# Patient Record
Sex: MALE | Race: WHITE | URBAN - METROPOLITAN AREA
[De-identification: names, ages, dates, MRNs, and addresses within clinical notes are randomized per-mention and may not be internally consistent; named-entity substitution may affect disease eponyms.]

---

## 2018-04-17 ENCOUNTER — OFFICE VISIT (OUTPATIENT)
Dept: FAMILY MEDICINE | Facility: CLINIC | Age: 68
End: 2018-04-17
Payer: COMMERCIAL

## 2018-04-17 VITALS
SYSTOLIC BLOOD PRESSURE: 122 MMHG | TEMPERATURE: 97.8 F | BODY MASS INDEX: 32.2 KG/M2 | HEART RATE: 84 BPM | WEIGHT: 226 LBS | RESPIRATION RATE: 20 BRPM | OXYGEN SATURATION: 95 % | DIASTOLIC BLOOD PRESSURE: 70 MMHG

## 2018-04-17 DIAGNOSIS — Z23 NEED FOR PROPHYLACTIC VACCINATION AGAINST STREPTOCOCCUS PNEUMONIAE (PNEUMOCOCCUS): ICD-10-CM

## 2018-04-17 DIAGNOSIS — J41.1 MUCOPURULENT CHRONIC BRONCHITIS (H): ICD-10-CM

## 2018-04-17 DIAGNOSIS — Z23 NEED FOR PROPHYLACTIC VACCINATION WITH TETANUS-DIPHTHERIA (TD): ICD-10-CM

## 2018-04-17 DIAGNOSIS — Z13.6 SCREENING FOR AAA (ABDOMINAL AORTIC ANEURYSM): ICD-10-CM

## 2018-04-17 DIAGNOSIS — H26.493 OTHER SECONDARY CATARACT OF BOTH EYES: Primary | ICD-10-CM

## 2018-04-17 PROBLEM — H26.40 SECONDARY CATARACT OF BOTH EYES: Status: ACTIVE | Noted: 2018-04-17

## 2018-04-17 PROCEDURE — 90670 PCV13 VACCINE IM: CPT | Performed by: FAMILY MEDICINE

## 2018-04-17 PROCEDURE — 90471 IMMUNIZATION ADMIN: CPT | Performed by: FAMILY MEDICINE

## 2018-04-17 PROCEDURE — 99214 OFFICE O/P EST MOD 30 MIN: CPT | Mod: 25 | Performed by: FAMILY MEDICINE

## 2018-04-17 PROCEDURE — G0009 ADMIN PNEUMOCOCCAL VACCINE: HCPCS | Mod: 59 | Performed by: FAMILY MEDICINE

## 2018-04-17 PROCEDURE — 90715 TDAP VACCINE 7 YRS/> IM: CPT | Performed by: FAMILY MEDICINE

## 2018-04-17 RX ORDER — ALBUTEROL SULFATE 90 UG/1
2 AEROSOL, METERED RESPIRATORY (INHALATION) EVERY 6 HOURS PRN
Qty: 3 INHALER | Refills: 1 | Status: SHIPPED | OUTPATIENT
Start: 2018-04-17 | End: 2019-03-01

## 2018-04-17 RX ORDER — TIOTROPIUM BROMIDE 18 UG/1
CAPSULE ORAL; RESPIRATORY (INHALATION)
Qty: 30 CAPSULE | Refills: 1 | Status: SHIPPED | OUTPATIENT
Start: 2018-04-17 | End: 2018-05-18

## 2018-04-17 ASSESSMENT — ENCOUNTER SYMPTOMS
CONSTITUTIONAL NEGATIVE: 1
CARDIOVASCULAR NEGATIVE: 1
SPUTUM PRODUCTION: 1
COUGH: 1
WHEEZING: 1
SHORTNESS OF BREATH: 1

## 2018-04-17 NOTE — NURSING NOTE
Screening Questionnaire for Adult Immunization    Are you sick today?   No   Do you have allergies to medications, food, a vaccine component or latex?   No   Have you ever had a serious reaction after receiving a vaccination?   No   Do you have a long-term health problem with heart disease, lung disease, asthma, kidney disease, metabolic disease (e.g. diabetes), anemia, or other blood disorder?   Yes   Do you have cancer, leukemia, HIV/AIDS, or any other immune system problem?   No   In the past 3 months, have you taken medications that affect  your immune system, such as prednisone, other steroids, or anticancer drugs; drugs for the treatment of rheumatoid arthritis, Crohn s disease, or psoriasis; or have you had radiation treatments?   No   Have you had a seizure, or a brain or other nervous system problem?   No   During the past year, have you received a transfusion of blood or blood     products, or been given immune (gamma) globulin or antiviral drug?   No   For women: Are you pregnant or is there a chance you could become        pregnant during the next month?   No   Have you received any vaccinations in the past 4 weeks?   No     Immunization questionnaire was positive for at least one answer.  Notified Dr. Figueroa.        Per orders of Dr. Figueroa, injection of Tdap, Prevnar 13 given by Xochitl Meeks. Patient instructed to remain in clinic for 15 minutes afterwards, and to report any adverse reaction to me immediately.       Screening performed by Xochitl Meeks on 4/17/2018 at 1:04 PM.

## 2018-04-17 NOTE — MR AVS SNAPSHOT
After Visit Summary   4/17/2018    Vivek Simmons    MRN: [id number]           Patient Information     Date Of Birth          1950        Visit Information        Provider Department      4/17/2018 11:40 AM Ish Figueroa MD [facility]        Today's Diagnoses     Other secondary cataract of both eyes    -  1    Need for prophylactic vaccination against Streptococcus pneumoniae (pneumococcus)        Need for prophylactic vaccination with tetanus-diphtheria (TD)        Screening for AAA (abdominal aortic aneurysm)        Mucopurulent chronic bronchitis (H)           Follow-ups after your visit        Additional Services     OPHTHALMOLOGY ADULT REFERRAL       Your provider has referred you to: [facility]  [phone]  [email or web address]    Please be aware that coverage of these services is subject to the terms and limitations of your health insurance plan.  Call member services at your health plan with any benefit or coverage questions.      Please bring the following with you to your appointment:    (1) Any X-Rays, CTs or MRIs which have been performed.  Contact the facility where they were done to arrange for  prior to your scheduled appointment.    (2) List of current medications  (3) This referral request   (4) Any documents/labs given to you for this referral                  Follow-up notes from your care team     Return in about 1 month (around 5/17/2018).      Future tests that were ordered for you today     Open Future Orders        Priority Expected Expires Ordered    Spirometry, Breathing Capacity, Normal Order, Clinic Performed Routine  7/16/2018 4/17/2018            Who to contact     If you have questions or need follow up information about today's clinic visit or your schedule please contact [facility] directly at [phone].  Normal or non-critical lab and imaging results will  be communicated to you by [employer], letter or phone within 4 business days after the clinic has received the results. If you do not hear from us within 7 days, please contact the clinic through [employer] or phone. If you have a critical or abnormal lab result, we will notify you by phone as soon as possible.  Submit refill requests through [employer] or call your pharmacy and they will forward the refill request to us. Please allow 3 business days for your refill to be completed.          Additional Information About Your Visit        Cook[employer] Information     [employer] gives you secure access to your electronic health record. If you see a primary care provider, you can also send messages to your care team and make appointments. If you have questions, please call your primary care clinic.  If you do not have a primary care provider, please call [phone] and they will assist you.        Care EveryWhere ID     This is your Care EveryWhere ID. This could be used by other organizations to access your [facility] medical records  [id number]        Your Vitals Were     Pulse Temperature Respirations Pulse Oximetry BMI (Body Mass Index)       84 97.8  F (36.6  C) (Oral) 20 95% 32.2 kg/m2        Blood Pressure from Last 3 Encounters:   04/17/18 122/70   09/19/14 114/70   07/13/12 114/68    Weight from Last 3 Encounters:   04/17/18 226 lb (102.5 kg)   09/19/14 222 lb 8 oz (100.9 kg)   07/13/12 227 lb (103 kg)              We Performed the Following          ADMIN VACCINE, ADDL [83166]          ADMIN VACCINE, FIRST     OPHTHALMOLOGY ADULT REFERRAL     Pneumococcal vaccine 13 valent PCV13 IM (Prevnar) [56544]     TDAP VACCINE (ADACEL)          Today's Medication Changes          These changes are accurate as of 4/17/18 12:19 PM.  If you have any questions, ask your nurse or doctor.               Start taking these medicines.        Dose/Directions    OPTICHAMBER ADVANTAGE Misc   Used for:  Mucopurulent chronic bronchitis (H)    Started by:  Ish Figueroa MD        Dose:  1 Device   1 Device as needed   Quantity:  1 each   Refills:  0         These medicines have changed or have updated prescriptions.        Dose/Directions    tiotropium 18 MCG capsule   Commonly known as:  SPIRIVA HANDIHALER   This may have changed:  additional instructions   Changed by:  Ish Figueroa MD        Inhale contents of one capsule daily.   Quantity:  30 capsule   Refills:  1         Stop taking these medicines if you haven't already. Please contact your care team if you have questions.     BREO ELLIPTA IN   Stopped by:  Ish Figueroa MD           levofloxacin 750 MG tablet   Commonly known as:  LEVAQUIN   Stopped by:  Ish Figueroa MD                Where to get your medicines      These medications were sent to [facility]/pharmacy #[id number] - [place], [place] - [address]  [facility]  [address] [address], [facility] [address]     Phone:  [phone]     albuterol 108 (90 Base) MCG/ACT Inhaler    OPTICHAMBER ADVANTAGE Misc    tiotropium 18 MCG capsule                Primary Care Provider Office Phone # Fax #    Robin Ya Wong [phone] [address] [place] [address]        Equal Access to Services     [place]A : Hadii mart ku hadasho Soomaali, waaxda luqadaha, qaybta kaalmada adeegyada, stuart guan. So [facility] [phone].    ATENCIÓN: Si habla español, tiene a martin disposición servicios gratuitos de asistencia lingüística. Kristin al [phone].    We comply with applicable federal civil rights laws and [place] laws. We do not discriminate on the basis of race, color, national origin, age, disability, sex, sexual orientation, or gender identity.            Thank you!     Thank you for choosing [facility]  for your care. Our goal is always to provide you with excellent care. Hearing back from our patients is one way we can continue to improve our  services. Please take a few minutes to complete the written survey that you may receive in the mail after your visit with us. Thank you!             Your Updated Medication List - Protect others around you: Learn how to safely use, store and throw away your medicines at www.disposemymeds.org.          This list is accurate as of 4/17/18 12:19 PM.  Always use your most recent med list.                   Brand Name Dispense Instructions for use Diagnosis    albuterol 108 (90 Base) MCG/ACT Inhaler    PROAIR HFA/PROVENTIL HFA/VENTOLIN HFA    3 Inhaler    Inhale 2 puffs into the lungs every 6 hours as needed for shortness of breath / dyspnea        OPTICHAMBER ADVANTAGE Misc     1 each    1 Device as needed    Mucopurulent chronic bronchitis (H)       tiotropium 18 MCG capsule    SPIRIVA HANDIHALER    30 capsule    Inhale contents of one capsule daily.

## 2018-04-17 NOTE — PROGRESS NOTES
HPI    SUBJECTIVE:   Vivek Simmons is a 67 year old male who presents to clinic today for the following health issues:    COPD Follow-Up    Symptoms are currently: slightly worsened    Current fatigue or dyspnea with ambulation: worsened from baseline    Shortness of breath: slightly worsened    Increased or change in Cough/Sputum: Yes-  Productive cough    Fever(s): No    Baseline ambulation without stopping to rest:  200 feet. Able to walk up 7 flights of stairs without stopping to rest.    Any ER/UC or hospital admissions since your last visit? No     History   Smoking Status     Current Every Day Smoker   Smokeless Tobacco     Not on file     Comment: 1 and a half a day      No results found for: FEV1, WHH9OKH    Amount of exercise or physical activity: None    Problems taking medications regularly: No    Medication side effects: none    Diet: regular (no restrictions)    Increasing MALAGON, more vulnerable to respiratory infections.  Wife is also smoking about a ppd.  Was feeling sort of breath until a few days ago when he started some old doxy.    Retired , lots of eye damage, bad cataracts.    Review of Systems   Constitutional: Negative.    HENT: Negative.    Respiratory: Positive for cough, sputum production, shortness of breath and wheezing.    Cardiovascular: Negative.          Physical Exam   Constitutional: He is oriented to person, place, and time and well-developed, well-nourished, and in no distress.   Eyes: Conjunctivae and EOM are normal.   Cardiovascular: Normal rate, regular rhythm and normal heart sounds.    Pulmonary/Chest: Effort normal. He has wheezes. He has rhonchi.   Musculoskeletal: He exhibits no edema.   Neurological: He is alert and oriented to person, place, and time.   Skin: Skin is warm and dry.   Vitals reviewed.    (H26.493) Other secondary cataract of both eyes  (primary encounter diagnosis)  Comment: referral set up  Plan: OPHTHALMOLOGY ADULT REFERRAL            (Z23) Need  for prophylactic vaccination against Streptococcus pneumoniae (pneumococcus)  Comment: important with COPD  Plan: Pneumococcal vaccine 13 valent PCV13 IM         (Prevnar) [41174],      ADMIN VACCINE, ADDL         [76160]            (Z23) Need for prophylactic vaccination with tetanus-diphtheria (TD)  Comment:   Plan: TDAP VACCINE (ADACEL),      ADMIN VACCINE,         FIRST            (Z13.6) Screening for AAA (abdominal aortic aneurysm)  Comment:   Plan: deferring    (J41.1) Mucopurulent chronic bronchitis (H)  Comment: restarting   Plan: Spacer/Aero-Holding Chambers (OPTICHAMBER         ADVANTAGE) MISC, albuterol, COPD              RTC in 1m    Ish Figueroa MD

## 2018-05-15 NOTE — PROGRESS NOTES
[address], [address]  [facility] [address]  [phone]  Dept: [phone]    PRE-OP EVALUATION:  Today's date: 2018    Vivek Simmons (: 1950) presents for pre-operative evaluation assessment as requested by Dr. Blackwood.  He requires evaluation and anesthesia risk assessment prior to undergoing surgery/procedure for treatment of cataracts.    Proposed Surgery/ Procedure: Cataract  Date of Surgery/ Procedure: 18  Time of Surgery/ Procedure: 1:30 pm  Hospital/Surgical Facility: [facility]  Primary Physician: Robin Wong  Type of Anesthesia Anticipated: to be determined    Patient has a Health Care Directive or Living Will:  NO    1. NO - Do you have a history of heart attack, stroke, stent, bypass or surgery on an artery in the head, neck, heart or legs?  2. NO - Do you ever have any pain or discomfort in your chest?  3. NO - Do you have a history of  Heart Failure?  4. NO - Are you troubled by shortness of breath when: walking on the level, up a slight hill or at night?  5. NO - Do you currently have a cold, bronchitis or other respiratory infection?  6. NO - Do you have a cough, shortness of breath or wheezing?  7. NO - Do you sometimes get pains in the calves of your legs when you walk?  8. NO - Do you or anyone in your family have previous history of blood clots?  9. NO - Do you or does anyone in your family have a serious bleeding problem such as prolonged bleeding following surgeries or cuts?  10. NO - Have you ever had problems with anemia or been told to take iron pills?  11. NO - Have you had any abnormal blood loss such as black, tarry or bloody stools, or abnormal vaginal bleeding?  12. NO - Have you ever had a blood transfusion?  13. NO - Have you or any of your relatives ever had problems with anesthesia?  14. NO - Do you have sleep apnea, excessive snoring or daytime drowsiness?  15. NO - Do you have any  prosthetic heart valves?  16. NO - Do you have prosthetic joints?  17. NO - Is there any chance that you may be pregnant?      HPI:     HPI related to upcoming procedure: Senile cataract, scheduled for R eye first, L eye in two weeks  See problem list for active medical problems.  Problems all longstanding and stable, except as noted/documented.  See ROS for pertinent symptoms related to these conditions.                                                                                                                                                          .  COPD - Patient has a longstanding history of moderate-severe COPD . Patient has been doing well overall noting NO SYMPTOMS and continues on medication regimen consisting of tiotroprium without adverse reactions or side effects.                                                                                                         .        MEDICAL HISTORY:     Patient Active Problem List    Diagnosis Date Noted     Secondary cataract of both eyes 04/17/2018     Priority: Medium     Nicotine dependence 09/19/2014     Priority: Medium     CARDIOVASCULAR SCREENING; LDL GOAL LESS THAN 130 07/24/2012     Priority: Medium     Mucopurulent chronic bronchitis (H) 07/14/2012     Priority: Medium     Advanced directives, counseling/discussion 07/13/2012     Priority: Medium     Discussed advance care planning with patient; however, patient declined at this time. 7/13/2012           History reviewed. No pertinent past medical history.  History reviewed. No pertinent surgical history.  Current Outpatient Prescriptions   Medication Sig Dispense Refill     albuterol (PROAIR HFA/PROVENTIL HFA/VENTOLIN HFA) 108 (90 Base) MCG/ACT Inhaler Inhale 2 puffs into the lungs every 6 hours as needed for shortness of breath / dyspnea 3 Inhaler 1     Spacer/Aero-Holding Chambers (OPTICHAMBER ADVANTAGE) MISC 1 Device as needed 1 each 0     tiotropium (SPIRIVA HANDIHALER) 18 MCG capsule  "Inhale contents of one capsule daily. 30 capsule 1     OTC products: None, except as noted above    No Known Allergies   Latex Allergy: NO    Social History   Substance Use Topics     Smoking status: Current Every Day Smoker     Smokeless tobacco: Never Used      Comment: 1 and a half cigarettes a day  - 45+ py     Alcohol use No     History   Drug Use No       REVIEW OF SYSTEMS:   CONSTITUTIONAL: NEGATIVE for fever, chills, change in weight  ENT/MOUTH: NEGATIVE for ear, mouth and throat problems  RESP: NEGATIVE for significant cough or SOB  CV: NEGATIVE for chest pain, palpitations or peripheral edema    EXAM:   /70 (BP Location: Right arm, Patient Position: Chair, Cuff Size: Adult Large)  Pulse 80  Temp 97.6  F (36.4  C) (Oral)  Resp 20  Ht 5' 10.5\" (1.791 m)  Wt 228 lb 1.6 oz (103.5 kg)  SpO2 100%  BMI 32.27 kg/m2  GENERAL APPEARANCE: healthy, alert and no distress  HENT: ear canals and TM's normal and nose and mouth without ulcers or lesions  RESP: lungs clear to auscultation - no rales, rhonchi or wheezes  CV: regular rate and rhythm, normal S1 S2, no S3 or S4 and no murmur, click or rub   ABDOMEN: soft, nontender, no HSM or masses and bowel sounds normal  NEURO: Normal strength and tone, sensory exam grossly normal, mentation intact and speech normal    DIAGNOSTICS:   No labs or EKG required for low risk surgery (cataract, skin procedure, breast biopsy, etc)    Recent Labs   Lab Test  09/19/14   1511   NA  136   POTASSIUM  4.4   CR  0.91        IMPRESSION:   Reason for surgery/procedure: senile cataract  Diagnosis/reason for consult: preoperative clearance    The proposed surgical procedure is considered LOW risk.    REVISED CARDIAC RISK INDEX  The patient has the following serious cardiovascular risks for perioperative complications such as (MI, PE, VFib and 3  AV Block):  No serious cardiac risks  INTERPRETATION: 0 risks: Class I (very low risk - 0.4% complication rate)    The patient has the " following additional risks for perioperative complications:  No identified additional risks      ICD-10-CM    1. Preop general physical exam Z01.818        RECOMMENDATIONS:       --Patient is to take all scheduled medications on the day of surgery EXCEPT for modifications listed below.    APPROVAL GIVEN to proceed with proposed procedure, without further diagnostic evaluation       Signed Electronically by: Ish Figueroa MD    Copy of this evaluation report is provided to requesting physician.    [facility] Preop Guidelines    Revised Cardiac Risk Index

## 2018-05-16 ENCOUNTER — OFFICE VISIT (OUTPATIENT)
Dept: FAMILY MEDICINE | Facility: CLINIC | Age: 68
End: 2018-05-16
Payer: COMMERCIAL

## 2018-05-16 VITALS
BODY MASS INDEX: 31.93 KG/M2 | TEMPERATURE: 97.6 F | DIASTOLIC BLOOD PRESSURE: 70 MMHG | RESPIRATION RATE: 20 BRPM | HEART RATE: 80 BPM | OXYGEN SATURATION: 100 % | WEIGHT: 228.1 LBS | HEIGHT: 71 IN | SYSTOLIC BLOOD PRESSURE: 116 MMHG

## 2018-05-16 DIAGNOSIS — Z01.818 PREOP GENERAL PHYSICAL EXAM: Primary | ICD-10-CM

## 2018-05-16 PROCEDURE — 99214 OFFICE O/P EST MOD 30 MIN: CPT | Performed by: FAMILY MEDICINE

## 2018-05-16 NOTE — MR AVS SNAPSHOT
After Visit Summary   5/16/2018    Vivek Simmons    MRN: [id number]           Patient Information     Date Of Birth          1950        Visit Information        Provider Department      5/16/2018 9:00 AM Ish Figueroa MD [facility]        Today's Diagnoses     Preop general physical exam    -  1      Care Instructions      Before Your Surgery      Call your surgeon if there is any change in your health. This includes signs of a cold or flu (such as a sore throat, runny nose, cough, rash or fever).    Do not smoke, drink alcohol or take over the counter medicine (unless your surgeon or primary care doctor tells you to) for the 24 hours before and after surgery.    If you take prescribed drugs: Follow your doctor s orders about which medicines to take and which to stop until after surgery.    Eating and drinking prior to surgery: follow the instructions from your surgeon    Take a shower or bath the night before surgery. Use the soap your surgeon gave you to gently clean your skin. If you do not have soap from your surgeon, use your regular soap. Do not shave or scrub the surgery site.  Wear clean pajamas and have clean sheets on your bed.           Follow-ups after your visit        Follow-up notes from your care team     Return in about 1 week (around 5/23/2018), or if symptoms worsen or fail to improve.      Your next 10 appointments already scheduled     May 18, 2018 11:20 AM CDT   [facility]    [address] [place] [address], [address]  [facility] [address] [phone]              Who to contact     If you have questions or need follow up information about today's clinic visit or your schedule please contact [facility] directly at [phone].  Normal or non-critical lab and imaging results will be communicated to you by MyChart, letter or phone within 4 business  "days after the clinic has received the results. If you do not hear from us within 7 days, please contact the clinic through [employer] or phone. If you have a critical or abnormal lab result, we will notify you by phone as soon as possible.  Submit refill requests through [employer] or call your pharmacy and they will forward the refill request to us. Please allow 3 business days for your refill to be completed.          Additional Information About Your Visit        [employer] Information     [employer] gives you secure access to your electronic health record. If you see a primary care provider, you can also send messages to your care team and make appointments. If you have questions, please call your primary care clinic.  If you do not have a primary care provider, please call [phone] and they will assist you.        Care EveryWhere ID     This is your Care EveryWhere ID. This could be used by other organizations to access your [facility] medical records  [id number]        Your Vitals Were     Pulse Temperature Respirations Height Pulse Oximetry BMI (Body Mass Index)    80 97.6  F (36.4  C) (Oral) 20 5' 10.5\" (1.791 m) 100% 32.27 kg/m2       Blood Pressure from Last 3 Encounters:   05/16/18 116/70   04/17/18 122/70   09/19/14 114/70    Weight from Last 3 Encounters:   05/16/18 228 lb 1.6 oz (103.5 kg)   04/17/18 226 lb (102.5 kg)   09/19/14 222 lb 8 oz (100.9 kg)              Today, you had the following     No orders found for display       Primary Care Provider Office Phone # Fax #    Robin Ya Wong [phone] [address] [place] [address]        Equal Access to Services     [place] : Hadtomi Peralta, gary ojeda, stuart landon. So [facility] [phone].    ATENCIÓN: Si habla español, tiene a martin disposición servicios gratuitos de asistencia lingüística. Llame al [phone].    We comply with applicable " federal civil rights laws and [place] laws. We do not discriminate on the basis of race, color, national origin, age, disability, sex, sexual orientation, or gender identity.            Thank you!     Thank you for choosing [facility]  for your care. Our goal is always to provide you with excellent care. Hearing back from our patients is one way we can continue to improve our services. Please take a few minutes to complete the written survey that you may receive in the mail after your visit with us. Thank you!             Your Updated Medication List - Protect others around you: Learn how to safely use, store and throw away your medicines at www.disposemymeds.org.          This list is accurate as of 5/16/18  9:16 AM.  Always use your most recent med list.                   Brand Name Dispense Instructions for use Diagnosis    albuterol 108 (90 Base) MCG/ACT Inhaler    PROAIR HFA/PROVENTIL HFA/VENTOLIN HFA    3 Inhaler    Inhale 2 puffs into the lungs every 6 hours as needed for shortness of breath / dyspnea    Mucopurulent chronic bronchitis (H)       OPTICHAMBER ADVANTAGE Misc     1 each    1 Device as needed    Mucopurulent chronic bronchitis (H)       tiotropium 18 MCG capsule    SPIRIVA HANDIHALER    30 capsule    Inhale contents of one capsule daily.    Mucopurulent chronic bronchitis (H)

## 2018-05-18 ENCOUNTER — OFFICE VISIT (OUTPATIENT)
Dept: FAMILY MEDICINE | Facility: CLINIC | Age: 68
End: 2018-05-18
Payer: COMMERCIAL

## 2018-05-18 VITALS
DIASTOLIC BLOOD PRESSURE: 74 MMHG | WEIGHT: 229.1 LBS | RESPIRATION RATE: 16 BRPM | TEMPERATURE: 97.7 F | BODY MASS INDEX: 32.41 KG/M2 | HEART RATE: 80 BPM | SYSTOLIC BLOOD PRESSURE: 110 MMHG

## 2018-05-18 DIAGNOSIS — J41.1 MUCOPURULENT CHRONIC BRONCHITIS (H): Primary | ICD-10-CM

## 2018-05-18 DIAGNOSIS — Z13.6 SCREENING FOR AAA (ABDOMINAL AORTIC ANEURYSM): ICD-10-CM

## 2018-05-18 DIAGNOSIS — Z87.891 HISTORY OF SMOKING: ICD-10-CM

## 2018-05-18 DIAGNOSIS — Z12.11 SCREEN FOR COLON CANCER: ICD-10-CM

## 2018-05-18 DIAGNOSIS — Z11.59 NEED FOR HEPATITIS C SCREENING TEST: ICD-10-CM

## 2018-05-18 LAB
FEF 25/75: NORMAL
FEV-1: NORMAL
FEV1/FVC: NORMAL
FVC: NORMAL

## 2018-05-18 PROCEDURE — 94010 BREATHING CAPACITY TEST: CPT | Performed by: FAMILY MEDICINE

## 2018-05-18 PROCEDURE — 86803 HEPATITIS C AB TEST: CPT | Performed by: FAMILY MEDICINE

## 2018-05-18 PROCEDURE — 99214 OFFICE O/P EST MOD 30 MIN: CPT | Mod: 25 | Performed by: FAMILY MEDICINE

## 2018-05-18 PROCEDURE — 36415 COLL VENOUS BLD VENIPUNCTURE: CPT | Performed by: FAMILY MEDICINE

## 2018-05-18 RX ORDER — TIOTROPIUM BROMIDE 18 UG/1
CAPSULE ORAL; RESPIRATORY (INHALATION)
Qty: 90 CAPSULE | Refills: 1 | Status: SHIPPED | OUTPATIENT
Start: 2018-05-18 | End: 2018-12-04

## 2018-05-18 ASSESSMENT — ENCOUNTER SYMPTOMS
CONSTITUTIONAL NEGATIVE: 1
ABDOMINAL PAIN: 0
BLOOD IN STOOL: 1
SHORTNESS OF BREATH: 1
NAUSEA: 0
COUGH: 1
VOMITING: 0

## 2018-05-18 NOTE — PROGRESS NOTES
HPI    SUBJECTIVE:   Vivek Simmons is a 67 year old male who presents to clinic today for the following health issues:    COPD Follow-Up    Symptoms are currently: improved    Current fatigue or dyspnea with ambulation: stable     Shortness of breath: stable    Increased or change in Cough/Sputum: No    Fever(s): No    Baseline ambulation without stopping to rest:  200 feet. Able to walk up 2 flights of stairs without stopping to rest.    Any ER/UC or hospital admissions since your last visit? No     History   Smoking Status     Current Every Day Smoker   Smokeless Tobacco     Never Used     Comment: 1 and a half cigarettes a day  - 45+ py     No results found for: FEV1, [id number]    Amount of exercise or physical activity: None    Problems taking medications regularly: No    Medication side effects: none    Diet: regular (no restrictions)    Breathing seems pretty good during the day, coughing increases towards the end of the day.  Notes that he is sensitive to humidity.  Using albuterol nightly.  Will start to wheeze with effort - more than 2 flights of stairs, carrying weight more than 30 feet.  Thinks he's been on an inhaled steroid in the past.    Has never had colon cancer screen.  Notes that he will see some blood in his stools after eating meat.  Does also have known hemorrhoids.    Review of Systems   Constitutional: Negative.    Respiratory: Positive for cough and shortness of breath.    Gastrointestinal: Positive for blood in stool. Negative for abdominal pain, nausea and vomiting.   Genitourinary: Negative.          Physical Exam   Constitutional: He is oriented to person, place, and time and well-developed, well-nourished, and in no distress.   Eyes: Conjunctivae and EOM are normal.   Cardiovascular: Normal rate, regular rhythm and normal heart sounds.    Pulmonary/Chest: Effort normal. He has decreased breath sounds. He has wheezes.   Musculoskeletal: He exhibits no edema.   Neurological: He is alert  and oriented to person, place, and time.   Skin: Skin is warm and dry.   Vitals reviewed.    (J41.1) Mucopurulent chronic bronchitis (H)  (primary encounter diagnosis)  Comment: adding in daily inhaled steroids  Plan: tiotropium (SPIRIVA HANDIHALER) 18 MCG capsule,        fluticasone (FLOVENT DISKUS) 100 MCG/BLIST AEPB            (Z12.11) Screen for colon cancer  Comment:   Plan: Fecal colorectal cancer screen FIT - Future         (S+30)            (Z11.59) Need for hepatitis C screening test  Comment:   Plan: Hepatitis C Screen Reflex to HCV RNA Quant and         Genotype            (Z13.6) Screening for AAA (abdominal aortic aneurysm)  Comment:   Plan: US abdominal aorta limited            (Z87.891) History of smoking  Comment:   Plan: encouraged to quit      RTC in 1m    Ish Figueroa MD

## 2018-05-18 NOTE — MR AVS SNAPSHOT
After Visit Summary   5/18/2018    Vivek Simmons    MRN: [id number]           Patient Information     Date Of Birth          1950        Visit Information        Provider Department      5/18/2018 11:20 AM Ish Figueroa MD [facility]        Today's Diagnoses     Mucopurulent chronic bronchitis (H)    -  1    Screen for colon cancer        Need for hepatitis C screening test        Screening for AAA (abdominal aortic aneurysm)        History of smoking           Follow-ups after your visit        Future tests that were ordered for you today     Open Future Orders        Priority Expected Expires Ordered    Fecal colorectal cancer screen FIT - Future (S+30) Routine 6/8/2018 6/17/2018 5/18/2018    US abdominal aorta limited Routine  5/18/2019 5/18/2018            Who to contact     If you have questions or need follow up information about today's clinic visit or your schedule please contact [facility] directly at [phone].  Normal or non-critical lab and imaging results will be communicated to you by [employer], letter or phone within 4 business days after the clinic has received the results. If you do not hear from us within 7 days, please contact the clinic through [employer] or phone. If you have a critical or abnormal lab result, we will notify you by phone as soon as possible.  Submit refill requests through [employer] or call your pharmacy and they will forward the refill request to us. Please allow 3 business days for your refill to be completed.          Additional Information About Your Visit        MyChart Information     [employer] gives you secure access to your electronic health record. If you see a primary care provider, you can also send messages to your care team and make appointments. If you have questions, please call your primary care clinic.  If you do not have a primary care provider, please call [phone] and they will assist you.         Care EveryWhere ID     This is your Care EveryWhere ID. This could be used by other organizations to access your [place] medical records  [id number]        Your Vitals Were     Pulse Temperature Respirations BMI (Body Mass Index)          80 97.7  F (36.5  C) (Oral) 16 32.41 kg/m2         Blood Pressure from Last 3 Encounters:   05/18/18 110/74   05/16/18 116/70   04/17/18 122/70    Weight from Last 3 Encounters:   05/18/18 229 lb 1.6 oz (103.9 kg)   05/16/18 228 lb 1.6 oz (103.5 kg)   04/17/18 226 lb (102.5 kg)              We Performed the Following     Hepatitis C Screen Reflex to HCV RNA Quant and Genotype     Spirometry, Breathing Capacity, Normal Order, Clinic Performed          Today's Medication Changes          These changes are accurate as of 5/18/18 12:11 PM.  If you have any questions, ask your nurse or doctor.               Start taking these medicines.        Dose/Directions    fluticasone 100 MCG/BLIST Aepb   Commonly known as:  FLOVENT DISKUS   Used for:  Mucopurulent chronic bronchitis (H)   Started by:  Ish Figueroa MD        Dose:  1 puff   Inhale 1 puff into the lungs 2 times daily   Quantity:  3 Inhaler   Refills:  1            Where to get your medicines      These medications were sent to [id number] - [place], [place] - [address]  [address] [address], [facility] [address]     Phone:  [phone]     fluticasone 100 MCG/BLIST Aepb    tiotropium 18 MCG capsule                Primary Care Provider Office Phone # Fax #    Robin Ya Wong [phone] [address] [place] [address]        Equal Access to Services     [place] : Hadtomi Peralta, gary ojeda, baljinder sheralstuart woodward. So [facility] [place] [facility] [phone].    ATENCIÓN: Si habla español, tiene a martin disposición servicios gratuitos de asistencia lingüística. Llame al [phone].    We comply with applicable federal  civil rights laws and [place] laws. We do not discriminate on the basis of race, color, national origin, age, disability, sex, sexual orientation, or gender identity.            Thank you!     Thank you for choosing [facility]  for your care. Our goal is always to provide you with excellent care. Hearing back from our patients is one way we can continue to improve our services. Please take a few minutes to complete the written survey that you may receive in the mail after your visit with us. Thank you!             Your Updated Medication List - Protect others around you: Learn how to safely use, store and throw away your medicines at www.disposemymeds.org.          This list is accurate as of 5/18/18 12:11 PM.  Always use your most recent med list.                   Brand Name Dispense Instructions for use Diagnosis    albuterol 108 (90 Base) MCG/ACT Inhaler    PROAIR HFA/PROVENTIL HFA/VENTOLIN HFA    3 Inhaler    Inhale 2 puffs into the lungs every 6 hours as needed for shortness of breath / dyspnea    Mucopurulent chronic bronchitis (H)       fluticasone 100 MCG/BLIST Aepb    FLOVENT DISKUS    3 Inhaler    Inhale 1 puff into the lungs 2 times daily    Mucopurulent chronic bronchitis (H)       OPTICHAMBER ADVANTAGE Misc     1 each    1 Device as needed    Mucopurulent chronic bronchitis (H)       tiotropium 18 MCG capsule    SPIRIVA HANDIHALER    90 capsule    Inhale contents of one capsule daily.    Mucopurulent chronic bronchitis (H)

## 2018-05-20 LAB — HCV AB SERPL QL IA: NONREACTIVE

## 2018-12-04 DIAGNOSIS — J41.1 MUCOPURULENT CHRONIC BRONCHITIS (H): ICD-10-CM

## 2018-12-04 NOTE — TELEPHONE ENCOUNTER
"Requested Prescriptions   Pending Prescriptions Disp Refills     SPIRIVA HANDIHALER 18 MCG inhaled capsule [Pharmacy Med Name: SPIRIVA 18 MCG CP-HANDIHALER]  1    Last Written Prescription Date:  5/18/18  Last Fill Quantity: 90,  # refills: 1   Last Office Visit: 5/18/2018 Figueroa       Return in about 1 month (around 6/18/2018).     Future Office Visit:      Sig: INHALE CONTENTS OF ONE CAPSULE DAILY.    Asthma Maintenance Inhalers - Anticholinergics Passed    12/4/2018  5:20 AM       Passed - Patient is age 12 years or older       Passed - Recent (12 mo) or future (30 days) visit within the authorizing provider's specialty    Patient had office visit in the last 12 months or has a visit in the next 30 days with authorizing provider or within the authorizing provider's specialty.  See \"Patient Info\" tab in inbasket, or \"Choose Columns\" in Meds & Orders section of the refill encounter.                "

## 2018-12-06 RX ORDER — TIOTROPIUM BROMIDE 18 UG/1
CAPSULE ORAL; RESPIRATORY (INHALATION)
Qty: 30 CAPSULE | Refills: 1 | Status: SHIPPED | OUTPATIENT
Start: 2018-12-06 | End: 2018-12-21

## 2018-12-06 NOTE — TELEPHONE ENCOUNTER
Routing refill request to provider for review/approval because:  Patient needs to be seen because:  Was suppose to follow up in a month and didn't    Phu Edmondson RN, BSN

## 2018-12-20 ENCOUNTER — TELEPHONE (OUTPATIENT)
Dept: FAMILY MEDICINE | Facility: CLINIC | Age: 68
End: 2018-12-20

## 2018-12-20 DIAGNOSIS — J41.1 MUCOPURULENT CHRONIC BRONCHITIS (H): ICD-10-CM

## 2018-12-20 NOTE — TELEPHONE ENCOUNTER
Pharmacy calling about Spiriva  Pt last seen in May, was to f/u in 1 month  Request came in 12-6-18, 1 inhaler sent with 1 refill  1 inhaler is $120, but 3 inhaler is $160    [phone]

## 2018-12-21 RX ORDER — TIOTROPIUM BROMIDE 18 UG/1
18 CAPSULE ORAL; RESPIRATORY (INHALATION) DAILY
Qty: 90 CAPSULE | Refills: 0 | Status: SHIPPED | OUTPATIENT
Start: 2018-12-21 | End: 2019-03-06

## 2018-12-21 NOTE — TELEPHONE ENCOUNTER
Pt would like rx for the 3 rx's instead of the 1 fill with a refill, over due for appt    Please review    Rody Angel RN, BS  Clinical Nurse Triage.

## 2018-12-21 NOTE — TELEPHONE ENCOUNTER
*I can do this, if he makes his 6 month Follow up appt. Due to his chronic conditions, he must be seen every 6 months. Please offer appt, thank you

## 2019-02-04 ENCOUNTER — TELEPHONE (OUTPATIENT)
Dept: FAMILY MEDICINE | Facility: CLINIC | Age: 69
End: 2019-02-04

## 2019-02-04 NOTE — LETTER
[address], [address]  [facility] [address]  [phone]  February 19, 2019    Vivek Simmons  [address]  [place] [facility] [address]      Dear Vivek,    I care about your health and have reviewed your health plan.  I have reviewed your medical conditions, medication list, and lab results and am making recommendations  based on this review, to better manage your health.    You are particularly in need of attention regarding:  -Cholesterol, -Colon Cancer Screening and -COPD    I am recommending that you:  -schedule a FOLLOWUP OFFICE APPOINTMENT with me.       Here is a list of Health Maintenance topics that are due now or due soon:  Health Maintenance Due   Topic Date Due     COPD ACTION PLAN Q1 YR  1950     FIT Q1 YR  06/11/1960     ZOSTER IMMUNIZATION (1 of 2) 06/11/2000     AORTIC ANEURYSM SCREENING (SYSTEM ASSIGNED)  06/11/2015     ADVANCE DIRECTIVE PLANNING Q5 YRS  07/13/2017     INFLUENZA VACCINE (1) 09/01/2018       Please call us at [phone] (or use [employer]) to address the above recommendations.    Thank you for trusting [facility] and we appreciate the opportunity to serve you.  We look forward to supporting your healthcare needs in the future.    Healthy Regards,    Ish Figueroa MD/salima

## 2019-02-06 NOTE — TELEPHONE ENCOUNTER
Panel Management Review      Patient has the following on his problem list:       IVD   ASA: FAILED    Last LDL:    Lab Results   Component Value Date    CHOL 184 09/19/2014     Lab Results   Component Value Date    HDL 36 09/19/2014     Lab Results   Component Value Date     09/19/2014     Lab Results   Component Value Date    TRIG 129 09/19/2014        Lab Results   Component Value Date    CHOLHDLRATIO 5.1 09/19/2014        Is the patient on a Statin? NO   Is the patient on Aspirin? NO                    Last three blood pressure readings:  BP Readings from Last 3 Encounters:   05/18/18 110/74   05/16/18 116/70   04/17/18 122/70        Tobacco History:     History   Smoking Status     Current Every Day Smoker   Smokeless Tobacco     Never Used     Comment: 1 and a half cigarettes a day  - 45+ py         Composite cancer screening  Chart review shows that this patient is due/due soon for the following Colonoscopy  Summary:    Patient is due/failing the following:   COLONOSCOPY and FIT    Action needed:   Patient needs office visit for COPD follow up.    Type of outreach:    Phone, left message for patient to call back.  and Sent [employer] message.    Questions for provider review:    None                                                                                                                                    Genna Eubanks CMA ([facility])

## 2019-03-01 DIAGNOSIS — J41.1 MUCOPURULENT CHRONIC BRONCHITIS (H): ICD-10-CM

## 2019-03-04 RX ORDER — ALBUTEROL SULFATE 90 UG/1
2 AEROSOL, METERED RESPIRATORY (INHALATION) EVERY 6 HOURS PRN
Qty: 25.5 G | Refills: 1 | Status: SHIPPED | OUTPATIENT
Start: 2019-03-04 | End: 2019-06-13

## 2019-03-04 NOTE — TELEPHONE ENCOUNTER
"Requested Prescriptions   Pending Prescriptions Disp Refills     albuterol (PROAIR HFA/PROVENTIL HFA/VENTOLIN HFA) 108 (90 Base) MCG/ACT inhaler [Pharmacy Med Name: PROAIR HFA 90 MCG INHALER] 25.5 g 1    Last Written Prescription Date:  04/17/2018  Last Fill Quantity: 3 INHALER,  # refills: 1   Last office visit: 5/18/2018 with prescribing provider:  05/18/2018   Future Office Visit:     Sig: Inhale 2 puffs into the lungs every 6 hours as needed for shortness of breath / dyspnea    Asthma Maintenance Inhalers - Anticholinergics Passed - 3/1/2019  7:57 AM       Passed - Patient is age 12 years or older       Passed - Recent (12 mo) or future (30 days) visit within the authorizing provider's specialty    Patient had office visit in the last 12 months or has a visit in the next 30 days with authorizing provider or within the authorizing provider's specialty.  See \"Patient Info\" tab in inbasket, or \"Choose Columns\" in Meds & Orders section of the refill encounter.             Passed - Medication is active on med list        Alberto VARGHESET  "

## 2019-03-04 NOTE — TELEPHONE ENCOUNTER
Prescription approved per [facility] Refill Protocol  Rody Angel RN BS

## 2019-03-06 ENCOUNTER — ANCILLARY PROCEDURE (OUTPATIENT)
Dept: GENERAL RADIOLOGY | Facility: CLINIC | Age: 69
End: 2019-03-06
Attending: NURSE PRACTITIONER
Payer: COMMERCIAL

## 2019-03-06 ENCOUNTER — OFFICE VISIT (OUTPATIENT)
Dept: FAMILY MEDICINE | Facility: CLINIC | Age: 69
End: 2019-03-06
Payer: COMMERCIAL

## 2019-03-06 VITALS
RESPIRATION RATE: 18 BRPM | DIASTOLIC BLOOD PRESSURE: 72 MMHG | OXYGEN SATURATION: 96 % | SYSTOLIC BLOOD PRESSURE: 114 MMHG | TEMPERATURE: 97.7 F | HEART RATE: 92 BPM | HEIGHT: 71 IN | WEIGHT: 239 LBS | BODY MASS INDEX: 33.46 KG/M2

## 2019-03-06 DIAGNOSIS — J44.1 COPD EXACERBATION (H): Primary | ICD-10-CM

## 2019-03-06 DIAGNOSIS — R05.9 COUGH: ICD-10-CM

## 2019-03-06 DIAGNOSIS — J41.1 MUCOPURULENT CHRONIC BRONCHITIS (H): ICD-10-CM

## 2019-03-06 LAB
FEF 25/75: 0.34
FEV-1: 0.59
FEV1/FVC: NORMAL
FVC: 1.35

## 2019-03-06 PROCEDURE — 94010 BREATHING CAPACITY TEST: CPT | Performed by: NURSE PRACTITIONER

## 2019-03-06 PROCEDURE — 99214 OFFICE O/P EST MOD 30 MIN: CPT | Mod: 25 | Performed by: NURSE PRACTITIONER

## 2019-03-06 PROCEDURE — 71046 X-RAY EXAM CHEST 2 VIEWS: CPT | Mod: FY

## 2019-03-06 RX ORDER — ALBUTEROL SULFATE 90 UG/1
2 AEROSOL, METERED RESPIRATORY (INHALATION) EVERY 6 HOURS
Qty: 3 INHALER | Refills: 1 | Status: SHIPPED | OUTPATIENT
Start: 2019-03-06 | End: 2019-10-24

## 2019-03-06 RX ORDER — ALBUTEROL SULFATE 90 UG/1
2 AEROSOL, METERED RESPIRATORY (INHALATION) EVERY 6 HOURS PRN
Qty: 25.5 G | Refills: 1 | Status: CANCELLED | OUTPATIENT
Start: 2019-03-06

## 2019-03-06 RX ORDER — PREDNISONE 20 MG/1
40 TABLET ORAL DAILY
Qty: 10 TABLET | Refills: 0 | Status: SHIPPED | OUTPATIENT
Start: 2019-03-06 | End: 2019-03-11

## 2019-03-06 RX ORDER — TIOTROPIUM BROMIDE 18 UG/1
18 CAPSULE ORAL; RESPIRATORY (INHALATION) DAILY
Qty: 90 CAPSULE | Refills: 0 | Status: CANCELLED | OUTPATIENT
Start: 2019-03-06

## 2019-03-06 RX ORDER — TIOTROPIUM BROMIDE 18 UG/1
18 CAPSULE ORAL; RESPIRATORY (INHALATION) DAILY
Qty: 90 CAPSULE | Refills: 0 | Status: SHIPPED | OUTPATIENT
Start: 2019-03-06 | End: 2019-05-27

## 2019-03-06 RX ORDER — AZITHROMYCIN 250 MG/1
TABLET, FILM COATED ORAL
Qty: 6 TABLET | Refills: 0 | Status: SHIPPED | OUTPATIENT
Start: 2019-03-06 | End: 2019-03-11

## 2019-03-06 ASSESSMENT — PATIENT HEALTH QUESTIONNAIRE - PHQ9: SUM OF ALL RESPONSES TO PHQ QUESTIONS 1-9: 24

## 2019-03-06 ASSESSMENT — MIFFLIN-ST. JEOR: SCORE: 1868.29

## 2019-03-06 NOTE — PROGRESS NOTES
"  SUBJECTIVE:   Vivek Simmons is a 68 year old male who presents to clinic today for the following health issues:      Acute Illness   Acute illness concerns: SOB  Onset: x 2 months getting worse    Fever: no    Chills/Sweats: YES    Headache (location?): YES    Sinus Pressure:no    Conjunctivitis:  YES: both    Ear Pain: YES: right    Rhinorrhea: YES    Congestion: YES    Sore Throat: YES     Cough: YES-productive of clear sputum, productive of yellow sputum, productive of green sputum, with shortness of breath, worsening over time    Wheeze: YES    Decreased Appetite: YES    Nausea: YES- sometimes    Vomiting: no    Diarrhea:  YES    Dysuria/Freq.: YES    Fatigue/Achiness: YES    Sick/Strep Exposure: YES- cold grandkids     Therapies Tried and outcome: nyquil dayquil not helpful      Last week woke up and pillow was wet from sweat.  In the last week feels like he has picked up cold from his grandkids.  SOB has prgoressivly worsened since he was seen last (may 2018).  He notes breathing has been much worse in the last month.  He can walk 20 feet, but then has to take a break.  Cough has progressively worsened over the last several months.   Has always had a lot of phlegm--mostly clear and thick.  Sometimes yellow. Can be as thick as honey.  Still smoking, but 1 month ago he cut down to 1/2 ppd noting it \"was just time to do it\".    He ran out of spirvia months ago.  He ran out of flovent months ago as well.  Since being out of his inhalers he has been using his wife's inhaler (spiriva).  He has been out of his rescue inhaler for months as well.  Has been using his wife's albuterol 1-2x/day.  Previously was on breo and spouse reports that he felt much better when he was on this inhaler vs flovent.         Problem list and histories reviewed & adjusted, as indicated.  Additional history: as documented    Current Outpatient Medications   Medication Sig Dispense Refill     albuterol (PROAIR HFA/PROVENTIL " "HFA/VENTOLIN HFA) 108 (90 Base) MCG/ACT inhaler Inhale 2 puffs into the lungs every 6 hours as needed for shortness of breath / dyspnea 25.5 g 1     fluticasone (FLOVENT DISKUS) 100 MCG/BLIST AEPB Inhale 1 puff into the lungs 2 times daily 3 Inhaler 1     tiotropium (SPIRIVA HANDIHALER) 18 MCG inhaled capsule Inhale 1 capsule (18 mcg) into the lungs daily 90 capsule 0     Spacer/Aero-Holding Chambers (OPTICHAMBER ADVANTAGE) MISC 1 Device as needed 1 each 0     No Known Allergies    Reviewed and updated as needed this visit by clinical staff       Reviewed and updated as needed this visit by Provider         ROS:  Constitutional, HEENT, cardiovascular, pulmonary, gi and gu systems are negative, except as otherwise noted.    OBJECTIVE:     /72 (BP Location: Right arm, Patient Position: Sitting, Cuff Size: Adult Large)   Pulse 92   Temp 97.7  F (36.5  C) (Oral)   Resp 18   Ht 1.791 m (5' 10.5\")   Wt 108.4 kg (239 lb)   SpO2 96%   BMI 33.81 kg/m    Body mass index is 33.81 kg/m .  GENERAL: well nourished, alert and no distress  EYES: Eyes grossly normal to inspection and conjunctivae and sclerae normal  HENT: ear canals and TM's normal, nose and mouth without ulcers or lesions, nasal mucosa congested  NECK: no adenopathy, no asymmetry, masses, or scars and thyroid normal to palpation  RESP: diminished with exp wheeze throughout, increased work of breathing  CV: regular rate and rhythm, normal S1 S2, no S3 or S4, no murmur, click or rub   MS: no gross musculoskeletal defects noted   PSYCH: mentation appears normal, affect normal/bright    Diagnostic Test Results:  CXR: radiology read pending      ASSESSMENT/PLAN:       1. Cough  - XR Chest 2 Views; Future  - Spirometry, Breathing Capacity: Normal Order, Clinic Performed    2. COPD exacerbation (H)  Poor med compliance.  Prednisone and zithromax today.  Will restart breo in addition to spiriva.  Follow up and compliance with medications stressed.  Needs to " quit smoking.  Spoke to his spouse over the phone while in the room with Vivek explaining plan.    - predniSONE (DELTASONE) 20 MG tablet; Take 40 mg by mouth daily for 5 days.  Dispense: 10 tablet; Refill: 0  - azithromycin (ZITHROMAX) 250 MG tablet; Take 2 tablets (500 mg) by mouth daily for 1 day, THEN 1 tablet (250 mg) daily for 4 days.  Dispense: 6 tablet; Refill: 0  - albuterol (PROAIR HFA/PROVENTIL HFA/VENTOLIN HFA) 108 (90 Base) MCG/ACT inhaler; Inhale 2 puffs into the lungs every 6 hours  Dispense: 3 Inhaler; Refill: 1  - fluticasone-vilanterol (BREO ELLIPTA) 100-25 MCG/INH inhaler; Inhale 1 puff into the lungs daily  Dispense: 1 Inhaler; Refill: 1  - tiotropium (SPIRIVA HANDIHALER) 18 MCG inhaled capsule; Inhale 1 capsule (18 mcg) into the lungs daily  Dispense: 90 capsule; Refill: 0    F/u 1 mos; sooner if no improvement or worsening     NIGHAT Taveras [facility]

## 2019-03-06 NOTE — PATIENT INSTRUCTIONS
Start prednisone (steroid) today and take for 5 days.  Start zithromax (antibiotics) today and take for 5 days.  I refilled your albuterol (rescue inhaler).  I refilled your spiriva (use once daily).  I started you back on breo (use once daily).  Follow up with Dr. Figueroa in 1 month.  You need to quit smoking.

## 2019-04-08 ENCOUNTER — OFFICE VISIT (OUTPATIENT)
Dept: FAMILY MEDICINE | Facility: CLINIC | Age: 69
End: 2019-04-08
Payer: COMMERCIAL

## 2019-04-08 VITALS
SYSTOLIC BLOOD PRESSURE: 122 MMHG | DIASTOLIC BLOOD PRESSURE: 76 MMHG | HEART RATE: 80 BPM | RESPIRATION RATE: 22 BRPM | TEMPERATURE: 97.5 F | BODY MASS INDEX: 35.19 KG/M2 | WEIGHT: 248.8 LBS

## 2019-04-08 DIAGNOSIS — J44.1 COPD EXACERBATION (H): ICD-10-CM

## 2019-04-08 DIAGNOSIS — Z23 NEED FOR VACCINATION: ICD-10-CM

## 2019-04-08 DIAGNOSIS — J41.1 MUCOPURULENT CHRONIC BRONCHITIS (H): Primary | ICD-10-CM

## 2019-04-08 PROCEDURE — 99214 OFFICE O/P EST MOD 30 MIN: CPT | Mod: 25 | Performed by: FAMILY MEDICINE

## 2019-04-08 PROCEDURE — G0009 ADMIN PNEUMOCOCCAL VACCINE: HCPCS | Performed by: FAMILY MEDICINE

## 2019-04-08 PROCEDURE — 90732 PPSV23 VACC 2 YRS+ SUBQ/IM: CPT | Performed by: FAMILY MEDICINE

## 2019-04-08 ASSESSMENT — ENCOUNTER SYMPTOMS
CONSTITUTIONAL NEGATIVE: 1
CARDIOVASCULAR NEGATIVE: 1
FALLS: 0
COUGH: 1
SPUTUM PRODUCTION: 1
NEUROLOGICAL NEGATIVE: 1
SHORTNESS OF BREATH: 1
WHEEZING: 1

## 2019-04-08 NOTE — PROGRESS NOTES
HPI    SUBJECTIVE:                                                    Vivek Simmons is a 68 year old male who presents to clinic today for the following health issues:    COPD Follow-Up    Symptoms are currently: slightly worsened since stopping the steroid    Current fatigue or dyspnea with ambulation: worsened from baseline    Shortness of breath: slightly worsened    Increased or change in Cough/Sputum: Yes-  Difficulty bringing up sputum    Fever(s): No    Baseline ambulation without stopping to rest:  35-40 feet. Able to walk up 4 steps of stairs without stopping to rest.    Any ER/UC or hospital admissions since your last visit? No     History   Smoking Status     Current Every Day Smoker   Smokeless Tobacco     Never Used     Comment: 1 and a half cigarettes a day  - 45+ py     Lab Results   Component Value Date    FEV1 0.59 03/06/2019    ILL1OVC 44% 03/06/2019         Amount of exercise or physical activity: None    Problems taking medications regularly: No    Medication side effects: none    Diet: regular (no restrictions)    Seen about one month ago for acute COPD exacerbation.  Found oral steroid to be terribly helpful and breathing improved immediately and felt great while using.  Breathing issues returned only a few days after finishing steroid.  Notes that right now he can't walk more than about 25 feet without stopping to rest.  Currently using sprivea and breo.  Breo is helpful and really notices when he doesn't have it.    Is currently only smoking 1-2 cigarettes a day.        Review of Systems   Constitutional: Negative.    Respiratory: Positive for cough, sputum production, shortness of breath and wheezing.    Cardiovascular: Negative.    Musculoskeletal: Negative for falls.   Neurological: Negative.          Physical Exam   Constitutional: He is oriented to person, place, and time.   Eyes: Conjunctivae and EOM are normal.   Cardiovascular: Normal rate, regular rhythm and normal heart sounds.    Pulmonary/Chest: Effort normal. He has wheezes. He has no rales.   Musculoskeletal: He exhibits no edema.   Neurological: He is alert and oriented to person, place, and time.   Skin: Skin is warm and dry.   Vitals reviewed.      (J41.1) Mucopurulent chronic bronchitis (H)  (primary encounter diagnosis)  Comment: probably at baseline for COPD, will increase Breo dose, switch antimuscerinic  Plan: fluticasone-vilanterol (BREO ELLIPTA) 200-25         MCG/INH inhaler, Glycopyrrolate (SEEBRI         NEOHALER) 15.6 MCG CAPS capsule,         Spacer/Aero-Holding Chambers (OPTICHAMBER         ADVANTAGE) MISC            (J44.1) COPD exacerbation (H)  Comment:   Plan: stable?    (Z23) Need for vaccination  Comment:   Plan: PNEUMOCOCCAL VACCINE,ADULT,SQ OR IM              RTC in 1m    Ish Figueroa MD

## 2019-04-09 ENCOUNTER — TELEPHONE (OUTPATIENT)
Dept: FAMILY MEDICINE | Facility: CLINIC | Age: 69
End: 2019-04-09

## 2019-04-09 DIAGNOSIS — J41.1 MUCOPURULENT CHRONIC BRONCHITIS (H): Primary | ICD-10-CM

## 2019-04-09 NOTE — TELEPHONE ENCOUNTER
Prior Authorization Retail Medication Request    Medication/Dose: Glycopyrrolate (SEEBRI NEOHALER) 15.6 MCG CAPS capsule  ICD code (if different than what is on RX):    Previously Tried and Failed:  fluticasone-vilanterol (BREO ELLIPTA) 100-25 MCG/INH inhaler, FLOVENT DISKUS  Rationale:  Pt will be taking fluticasone-vilanterol (BREO ELLIPTA) 200-25 MCG/INH inhaler, SPIRIVA, and albuterol inhaler along with Seebri neohaler.  Other meds were not effective alone.    Insurance Name:  Unknown  Insurance ID:  Unknown      Pharmacy Information (if different than what is on RX)  Name:    Phone:

## 2019-04-17 NOTE — TELEPHONE ENCOUNTER
Central Prior Authorization Team   Phone: [phone]      PA Initiation    Medication: Glycopyrrolate (SEEBRI NEOHALER) 15.6 MCG CAPS capsule-Initiated  Insurance Company: Other (see comments)Comment:  [employer] [phone]  Pharmacy Filling the Rx: [id number] - [place], [place] - [address]  Filling Pharmacy Phone: [phone]  Filling Pharmacy Fax:    Start Date: 4/17/2019

## 2019-04-17 NOTE — TELEPHONE ENCOUNTER
PRIOR AUTHORIZATION DENIED    Medication: Glycopyrrolate (SEELUCAS HERER) 15.6 MCG CAPS capsule-DENIED    Denial Date: 4/17/2019    Denial Rational: Patient needs to try/fail Incruse Ellipta.        Appeal Information:     If provider would like to appeal please provide a letter of medical necessity stating why formulary alternatives would not be clinically appropriate for patient and route back to the PA team.

## 2019-04-18 NOTE — TELEPHONE ENCOUNTER
Dr. Figueroa,     See below, do you want us to start an appeal?    Natalya Glover RN -- [employer]

## 2019-04-18 NOTE — TELEPHONE ENCOUNTER
Pt has failed Spireva.  What are other equivalent medications that are covered?  If none, will need to file PA.    Ish Figueroa MD

## 2019-05-13 ENCOUNTER — OFFICE VISIT (OUTPATIENT)
Dept: FAMILY MEDICINE | Facility: CLINIC | Age: 69
End: 2019-05-13
Payer: COMMERCIAL

## 2019-05-13 VITALS
RESPIRATION RATE: 26 BRPM | BODY MASS INDEX: 35.42 KG/M2 | TEMPERATURE: 97.5 F | HEART RATE: 72 BPM | SYSTOLIC BLOOD PRESSURE: 126 MMHG | DIASTOLIC BLOOD PRESSURE: 78 MMHG | WEIGHT: 250.4 LBS

## 2019-05-13 DIAGNOSIS — J41.1 MUCOPURULENT CHRONIC BRONCHITIS (H): ICD-10-CM

## 2019-05-13 PROCEDURE — 99214 OFFICE O/P EST MOD 30 MIN: CPT | Performed by: FAMILY MEDICINE

## 2019-05-13 ASSESSMENT — ENCOUNTER SYMPTOMS
WHEEZING: 1
HEMOPTYSIS: 0
NEUROLOGICAL NEGATIVE: 1
COUGH: 1
CONSTITUTIONAL NEGATIVE: 1
SPUTUM PRODUCTION: 1
SHORTNESS OF BREATH: 1
CARDIOVASCULAR NEGATIVE: 1

## 2019-05-27 DIAGNOSIS — J44.1 COPD EXACERBATION (H): ICD-10-CM

## 2019-05-28 NOTE — TELEPHONE ENCOUNTER
"Requested Prescriptions   Pending Prescriptions Disp Refills     SPIRIVA HANDIHALER 18 MCG inhaled capsule [Pharmacy Med Name: SPIRIVA 18 MCG CP-HANDIHALER]  0     Sig: INHALE 1 CAPSULE (18 MCG) INTO THE LUNGS DAILY   Last Written Prescription Date:  3/6/19  Last Fill Quantity: 90,  # refills: 0   Last Office Visit: 5/13/2019 Figueroa      Return in about 1 month (around 6/13/2019), or COPD.   Future Office Visit:    Next 5 appointments (look out 90 days)    Jun 13, 2019 10:00 AM CDT  SHORT with Ish Figueroa MD  [facility] [address] [place] [address], [address]  [facility] [address] [phone]             Asthma Maintenance Inhalers - Anticholinergics Passed - 5/27/2019  8:38 AM        Passed - Patient is age 12 years or older        Passed - Recent (12 mo) or future (30 days) visit within the authorizing provider's specialty     Patient had office visit in the last 12 months or has a visit in the next 30 days with authorizing provider or within the authorizing provider's specialty.  See \"Patient Info\" tab in inbasket, or \"Choose Columns\" in Meds & Orders section of the refill encounter.              Passed - Medication is active on med list        "

## 2019-05-29 RX ORDER — TIOTROPIUM BROMIDE 18 UG/1
CAPSULE ORAL; RESPIRATORY (INHALATION)
Qty: 30 CAPSULE | Refills: 0 | Status: SHIPPED | OUTPATIENT
Start: 2019-05-29 | End: 2019-06-13

## 2019-05-29 NOTE — TELEPHONE ENCOUNTER
The Pt is scheduled for 6/13. Will send in 1 month supply.     Natalya Glover RN -- [facility]

## 2019-06-13 ENCOUNTER — OFFICE VISIT (OUTPATIENT)
Dept: FAMILY MEDICINE | Facility: CLINIC | Age: 69
End: 2019-06-13
Payer: COMMERCIAL

## 2019-06-13 ENCOUNTER — ANCILLARY PROCEDURE (OUTPATIENT)
Dept: GENERAL RADIOLOGY | Facility: CLINIC | Age: 69
End: 2019-06-13
Attending: FAMILY MEDICINE
Payer: COMMERCIAL

## 2019-06-13 VITALS
TEMPERATURE: 97.6 F | WEIGHT: 251 LBS | SYSTOLIC BLOOD PRESSURE: 122 MMHG | DIASTOLIC BLOOD PRESSURE: 66 MMHG | BODY MASS INDEX: 35.14 KG/M2 | HEART RATE: 75 BPM | HEIGHT: 71 IN | RESPIRATION RATE: 20 BRPM | OXYGEN SATURATION: 97 %

## 2019-06-13 DIAGNOSIS — J44.1 ACUTE EXACERBATION OF CHRONIC OBSTRUCTIVE PULMONARY DISEASE (COPD) (H): Primary | ICD-10-CM

## 2019-06-13 DIAGNOSIS — J41.1 MUCOPURULENT CHRONIC BRONCHITIS (H): ICD-10-CM

## 2019-06-13 DIAGNOSIS — J44.1 ACUTE EXACERBATION OF CHRONIC OBSTRUCTIVE PULMONARY DISEASE (COPD) (H): ICD-10-CM

## 2019-06-13 PROCEDURE — 99214 OFFICE O/P EST MOD 30 MIN: CPT | Performed by: FAMILY MEDICINE

## 2019-06-13 PROCEDURE — 71046 X-RAY EXAM CHEST 2 VIEWS: CPT | Mod: FY

## 2019-06-13 RX ORDER — TIOTROPIUM BROMIDE 18 UG/1
CAPSULE ORAL; RESPIRATORY (INHALATION)
Qty: 30 CAPSULE | Refills: 0 | Status: CANCELLED | OUTPATIENT
Start: 2019-06-13

## 2019-06-13 RX ORDER — AZITHROMYCIN 250 MG/1
TABLET, FILM COATED ORAL
Qty: 6 TABLET | Refills: 0 | Status: SHIPPED | OUTPATIENT
Start: 2019-06-13 | End: 2019-09-09

## 2019-06-13 RX ORDER — ALBUTEROL SULFATE 90 UG/1
2 AEROSOL, METERED RESPIRATORY (INHALATION) EVERY 6 HOURS PRN
Qty: 18 G | Refills: 11 | Status: SHIPPED | OUTPATIENT
Start: 2019-06-13

## 2019-06-13 RX ORDER — PREDNISONE 20 MG/1
40 TABLET ORAL DAILY
Qty: 10 TABLET | Refills: 0 | Status: SHIPPED | OUTPATIENT
Start: 2019-06-13 | End: 2019-09-09

## 2019-06-13 ASSESSMENT — ENCOUNTER SYMPTOMS
GASTROINTESTINAL NEGATIVE: 1
SHORTNESS OF BREATH: 1
DIAPHORESIS: 0
FEVER: 0
FATIGUE: 1
RHINORRHEA: 1
SINUS PRESSURE: 1
WHEEZING: 1
CARDIOVASCULAR NEGATIVE: 1
COUGH: 1

## 2019-06-13 ASSESSMENT — MIFFLIN-ST. JEOR: SCORE: 1917.72

## 2019-06-13 NOTE — PROGRESS NOTES
"Subjective     Vivek Simmons is a 69 year old male who presents to clinic today for the following health issues:    HPI   Medication Followup of INHALERS    Taking Medication as prescribed: yes    Side Effects:  None    Medication Helping Symptoms:  yes     Inhalers work, but seems to wear off by late afternoon. Also noting post nasal drip and facial pressure.  No fever, but does feel he has pneumonia again.  Wife thinks he's been having more difficulty in the last 2-3 weeks.  Med worked better initially and then there was a change after getting a cold.  No current fever.  Cough is mildly productive.  Lack of energy.    Review of Systems   Constitutional: Positive for fatigue. Negative for diaphoresis and fever.   HENT: Positive for congestion, rhinorrhea and sinus pressure.    Respiratory: Positive for cough, shortness of breath and wheezing.    Cardiovascular: Negative.    Gastrointestinal: Negative.              Objective    /66 (BP Location: Right arm, Patient Position: Sitting, Cuff Size: Adult Large)   Pulse 75   Temp 97.6  F (36.4  C) (Oral)   Resp 20   Ht 1.791 m (5' 10.5\")   Wt 113.9 kg (251 lb)   SpO2 97%   BMI 35.51 kg/m    Body mass index is 35.51 kg/m .  Physical Exam   Constitutional: He is oriented to person, place, and time. He appears well-developed and well-nourished.   Pulmonary/Chest: He has no decreased breath sounds. He has rhonchi. He has rales.   Diffuse rales and rhonchi   Neurological: He is alert and oriented to person, place, and time.   Skin: Skin is warm and dry.   Nursing note and vitals reviewed.     (J44.1) Acute exacerbation of chronic obstructive pulmonary disease (COPD) (H)  (primary encounter diagnosis)  Comment: possible LLL effusion.  Will treat as for eacerbation.  Plan: XR Chest 2 Views, azithromycin (ZITHROMAX) 250         MG tablet, predniSONE (DELTASONE) 20 MG tablet            (J41.1) Mucopurulent chronic bronchitis (H)  Comment:   Plan: " fluticasone-vilanterol (BREO ELLIPTA) 200-25         MCG/INH inhaler, albuterol (PROAIR         HFA/PROVENTIL HFA/VENTOLIN HFA) 108 (90 Base)         MCG/ACT inhaler, umeclidinium (INCRUSE ELLIPTA)        62.5 MCG/INH inhaler                RTC in 1w if not improving, 6m if so.    Ish Figueroa MD

## 2019-07-14 DIAGNOSIS — J41.1 MUCOPURULENT CHRONIC BRONCHITIS (H): ICD-10-CM

## 2019-07-15 NOTE — TELEPHONE ENCOUNTER
.Request to fill Incruse-  No RF is needed this was sent 6/13/19 for 90 with 1 RF      Deepali Das RN

## 2019-08-13 ENCOUNTER — TELEPHONE (OUTPATIENT)
Dept: FAMILY MEDICINE | Facility: CLINIC | Age: 69
End: 2019-08-13

## 2019-08-13 NOTE — LETTER
[address], [address]  [facility] [address] [phone]  August 20, 2019    Vivek Simmons  [address] [facility] [address]      Dear Vivek,    I care about your health and have reviewed your health plan.  I have reviewed your medical conditions, medication list, and lab results and am making recommendations  based on this review, to better manage your health.    You are particularly in need of attention regarding:  -Colon Cancer Screening and -Wellness (Physical) Visit    I am recommending that you:  -schedule a WELLNESS (Physical) APPOINTMENT with me.   I will check fasting labs the same day - nothing to eat except water and meds for 8-10 hours prior.  -schedule a COLONOSCOPY to look for colon cancer (due every 10 years or 5 years in higher risk situations.)   Colon cancer is now the second leading cause of death in the [place] for both men and women and there are over 130,000 new cases and 50,000 deaths per year from colon cancer.  Colonoscopies can prevent 90-95% of these deaths.  Problem lesions can be removed before they ever become cancer.  This test is not only looking for cancer, but also getting rid of precancerious lesions.  If you do not wish to do a colonoscopy or cannot afford to do one, at this time, there is another option. It is called a FIT test or Fecal Immunochemical Occult Blood Test (take home stool sample kit).  It does not replace the colonoscopy for colorectal cancer screening, but it can detect hidden bleeding in the lower colon.  It does need to be repeated every year and if a positive result is obtained, you would be referred for a colonoscopy.  If you have completed either one of these tests at another facility, please have the records sent to our clinic so that we can best coordinate your care.      Here is a list of Health Maintenance topics that are due now or due soon:  Health Maintenance Due   Topic Date Due     COPD  ACTION PLAN  1950     FIT  06/11/1960     ZOSTER IMMUNIZATION (1 of 2) 06/11/2000     MEDICARE ANNUAL WELLNESS VISIT  06/11/2015     AORTIC ANEURYSM SCREENING (SYSTEM ASSIGNED)  06/11/2015     ADVANCE CARE PLANNING  07/13/2017     PHQ-9  09/06/2019     LIPID  09/19/2019       Please call us at [phone] (or use [employer]) to address the above   recommendations.    Thank you for trusting [facility] and we appreciate the opportunity to serve you.  We look forward to supporting your healthcare needs in the future.    Healthy Regards,    Ish Figueroa MD

## 2019-08-13 NOTE — TELEPHONE ENCOUNTER
Panel Management Review      Patient has the following on his problem list: None      Composite cancer screening  Chart review shows that this patient is due/due soon for the following Fecal Colorectal (FIT)  Summary:    Patient is due/failing the following:   FIT and PHYSICAL    Action needed:   Patient needs office visit for medicare wellness visit. and Patient needs referral/order: FIT    Type of outreach:    Sent vChatter message.    Questions for provider review:    None                                                                                                                                    Amanda Krishnamurthy       Chart routed to Care Team .

## 2019-09-09 ENCOUNTER — OFFICE VISIT (OUTPATIENT)
Dept: FAMILY MEDICINE | Facility: CLINIC | Age: 69
End: 2019-09-09
Payer: COMMERCIAL

## 2019-09-09 VITALS
BODY MASS INDEX: 35.42 KG/M2 | WEIGHT: 253 LBS | RESPIRATION RATE: 24 BRPM | TEMPERATURE: 98.1 F | OXYGEN SATURATION: 97 % | HEART RATE: 84 BPM | SYSTOLIC BLOOD PRESSURE: 118 MMHG | DIASTOLIC BLOOD PRESSURE: 70 MMHG | HEIGHT: 71 IN

## 2019-09-09 DIAGNOSIS — J41.1 MUCOPURULENT CHRONIC BRONCHITIS (H): Primary | ICD-10-CM

## 2019-09-09 DIAGNOSIS — H57.12 LEFT EYE PAIN: ICD-10-CM

## 2019-09-09 PROCEDURE — 99214 OFFICE O/P EST MOD 30 MIN: CPT | Performed by: PHYSICIAN ASSISTANT

## 2019-09-09 RX ORDER — PREDNISONE 20 MG/1
20 TABLET ORAL 2 TIMES DAILY
Qty: 10 TABLET | Refills: 0 | Status: SHIPPED | OUTPATIENT
Start: 2019-09-09 | End: 2019-09-14

## 2019-09-09 RX ORDER — DOXYCYCLINE HYCLATE 100 MG
100 TABLET ORAL 2 TIMES DAILY
Qty: 14 TABLET | Refills: 0 | Status: SHIPPED | OUTPATIENT
Start: 2019-09-09 | End: 2019-09-13

## 2019-09-09 ASSESSMENT — MIFFLIN-ST. JEOR: SCORE: 1934.73

## 2019-09-09 NOTE — PROGRESS NOTES
Subjective     Vivek Simmons is a 69 year old male who presents to clinic today for the following health issues:    HPI   RESPIRATORY SYMPTOMS      Duration: Couple weeks ago, Came on really slow    Description  nasal congestion, rhinorrhea, facial pain/pressure, cough and fatigue/malaise    Severity: moderate    Accompanying signs and symptoms: Eye pain and redness    History (predisposing factors):  tobacco abuse    Precipitating or alleviating factors: 1 grandchild at age 2 diagnosed with strep, scratched him in the eye.     Therapies tried and outcome:  OTC NSAID    Patient is here today for a few reasons:  1. Over the last month or so with the weather change has had nasal congestion, productive cough, run down feeling  Has hx of COPD, recently had inhalers changed over the summer with improvement but now again, with the change in weather, his cough is acting up  Was using Ibuprofen just for the headaches  Does smoke    2. Patient is also complaining of left eye redness  Ongoing for about 1 week  He was scratched by grandson who was diagnosed with strep in the left eye  Since then has had a lot of left posterior eye pain, blurred vision, light sensitivity  No fever or chills  Using Ibuprofen without relief    Patient Active Problem List   Diagnosis     Advanced directives, counseling/discussion     Mucopurulent chronic bronchitis (H)     CARDIOVASCULAR SCREENING; LDL GOAL LESS THAN 130     Nicotine dependence     Secondary cataract of both eyes     History reviewed. No pertinent surgical history.    Social History     Tobacco Use     Smoking status: Current Every Day Smoker     Packs/day: 0.30     Smokeless tobacco: Never Used     Tobacco comment: 1/3 pack per day   Substance Use Topics     Alcohol use: No     Family History   Family history unknown: Yes         Current Outpatient Medications   Medication Sig Dispense Refill     albuterol (PROAIR HFA/PROVENTIL HFA/VENTOLIN HFA) 108 (90 Base) MCG/ACT inhaler  "Inhale 2 puffs into the lungs every 6 hours as needed for shortness of breath / dyspnea 18 g 11     albuterol (PROAIR HFA/PROVENTIL HFA/VENTOLIN HFA) 108 (90 Base) MCG/ACT inhaler Inhale 2 puffs into the lungs every 6 hours 3 Inhaler 1     doxycycline hyclate (VIBRA-TABS) 100 MG tablet Take 1 tablet (100 mg) by mouth 2 times daily for 7 days 14 tablet 0     fluticasone-vilanterol (BREO ELLIPTA) 100-25 MCG/INH inhaler Inhale 1 puff into the lungs daily 1 Inhaler 1     fluticasone-vilanterol (BREO ELLIPTA) 200-25 MCG/INH inhaler Inhale 1 puff into the lungs daily 180 Inhaler 1     predniSONE (DELTASONE) 20 MG tablet Take 1 tablet (20 mg) by mouth 2 times daily for 5 days 10 tablet 0     Spacer/Aero-Holding Chambers (OPTICHAMBER ADVANTAGE) MISC 1 Device as needed 1 each 0     Spacer/Aero-Holding Chambers (OPTICHAMBER ADVANTAGE) MISC 1 Device as needed 1 each 0     umeclidinium (INCRUSE ELLIPTA) 62.5 MCG/INH inhaler Inhale 1 puff into the lungs daily 90 each 1     No Known Allergies    Reviewed and updated as needed this visit by Provider         Review of Systems   ROS COMP: Constitutional, HEENT, cardiovascular, pulmonary, gi and gu systems are negative, except as otherwise noted.      Objective    /70 (BP Location: Right arm, Patient Position: Chair, Cuff Size: Adult Large)   Pulse 84   Temp 98.1  F (36.7  C) (Tympanic)   Resp 24   Ht 1.803 m (5' 11\")   Wt 114.8 kg (253 lb)   SpO2 97%   BMI 35.29 kg/m    Body mass index is 35.29 kg/m .  Physical Exam   GENERAL: healthy, alert and no distress  EYES: left eye: red, upper and lower lid swollen, conjunctiva erythematous  HENT: ear canals and TM's normal, nose and mouth without ulcers or lesions  NECK: no adenopathy, no asymmetry, masses, or scars and thyroid normal to palpation  RESP: rales throughout and expiratory wheezes throughout  CV: regular rate and rhythm, normal S1 S2, no S3 or S4, no murmur, click or rub, no peripheral edema and peripheral pulses " strong  MS: no gross musculoskeletal defects noted, no edema    Diagnostic Test Results:  none         Assessment & Plan     1. Mucopurulent chronic bronchitis (H)  New problem, with recent flare of COPD.  Recommend treatment with Doxycycline BID x 7 days, advised steroid use.  Recommend he use rescue inhaler q 4-6 hours as well.  F/U in 1 week if symptoms worsen or do not improve.  - doxycycline hyclate (VIBRA-TABS) 100 MG tablet; Take 1 tablet (100 mg) by mouth 2 times daily for 7 days  Dispense: 14 tablet; Refill: 0  - predniSONE (DELTASONE) 20 MG tablet; Take 1 tablet (20 mg) by mouth 2 times daily for 5 days  Dispense: 10 tablet; Refill: 0    2. Left eye pain  New problem, given history of possible abrasion and likely infection, blurred vision and pain, am concerned for uveitis.  Recommend formal eye exam- referred to vision clinic next door to be seen today.  Patient and wife are agreeable and will head there after this visit for exam.         Risks, benefits and alternatives were discussed with patient. Agreeable to the plan of care.      Return in about 1 week (around 9/16/2019) for If symptoms worsen or fail to improve.    Mervat Boogie PA-C  [facility]

## 2019-09-13 ENCOUNTER — TELEPHONE (OUTPATIENT)
Dept: FAMILY MEDICINE | Facility: CLINIC | Age: 69
End: 2019-09-13

## 2019-09-13 RX ORDER — AZITHROMYCIN 250 MG/1
TABLET, FILM COATED ORAL
Qty: 6 TABLET | Refills: 0 | Status: SHIPPED | OUTPATIENT
Start: 2019-09-13

## 2019-09-13 NOTE — TELEPHONE ENCOUNTER
The Pt and his wife Azalea called in to report nausea and vomiting while taking Doxycycline.   The Pt took once dose Tuesday morning and vomited several times that day.     Huddled with Mervat, Z-pack issued to replace doxy. Informed Azalea. Advised her to keep us informed if vomiting returns.     Natalya Glover RN -- [facility]

## 2019-09-28 ENCOUNTER — HEALTH MAINTENANCE LETTER (OUTPATIENT)
Age: 69
End: 2019-09-28

## 2019-10-24 DIAGNOSIS — J44.1 COPD EXACERBATION (H): ICD-10-CM

## 2019-10-24 RX ORDER — ALBUTEROL SULFATE 90 UG/1
AEROSOL, METERED RESPIRATORY (INHALATION)
Qty: 2 INHALER | Refills: 0 | Status: SHIPPED | OUTPATIENT
Start: 2019-10-24

## 2019-10-24 NOTE — TELEPHONE ENCOUNTER
Prescription approved per [facility] Refill Protocol.    Natalya Glover RN -- [facility]

## 2019-10-24 NOTE — TELEPHONE ENCOUNTER
"Requested Prescriptions   Pending Prescriptions Disp Refills     VENTOLIN  (90 Base) MCG/ACT inhaler [Pharmacy Med Name: VENTOLIN HFA 90 MCG INHALER] 54 Inhaler 1     Sig: INHALE 2 PUFFS INTO THE LUNGS EVERY 6 HOURS   Last Written Prescription Date:  6/13/19  Last Fill Quantity: 18g  ,  # refills: 11   Last Office Visit: 9/9/2019 [place]      Return in about 1 week (around 9/16/2019) for If symptoms worsen or fail to improve.     Future Office Visit:         Asthma Maintenance Inhalers - Anticholinergics Passed - 10/24/2019  1:33 AM        Passed - Patient is age 12 years or older        Passed - Recent (12 mo) or future (30 days) visit within the authorizing provider's specialty     Patient has had an office visit with the authorizing provider or a provider within the authorizing providers department within the previous 12 mos or has a future within next 30 days. See \"Patient Info\" tab in inbasket, or \"Choose Columns\" in Meds & Orders section of the refill encounter.              Passed - Medication is active on med list        "

## 2020-01-06 ENCOUNTER — TELEPHONE (OUTPATIENT)
Dept: FAMILY MEDICINE | Facility: CLINIC | Age: 70
End: 2020-01-06

## 2020-01-06 NOTE — LETTER
[address], [address]  [facility] [address]  [phone]  January 13, 2020    Vivek Simmons  [address] [facility] [address]      Dear Vivek,    I care about your health and have reviewed your health plan.  I have reviewed your medical conditions, medication list, and lab results and am making recommendations  based on this review, to better manage your health.    You are particularly in need of attention regarding:  -Colon Cancer Screening and -wellness visit    I am recommending that you:  -schedule a WELLNESS (Physical) APPOINTMENT with me.   I will check fasting labs the same day - nothing to eat except water and meds for 8-10 hours prior.      Here is a list of Health Maintenance topics that are due now or due soon:  Health Maintenance Due   Topic Date Due     COPD ACTION PLAN  1950     FIT  06/11/1960     ZOSTER IMMUNIZATION (1 of 2) 06/11/2000     MEDICARE ANNUAL WELLNESS VISIT  06/11/2015     AORTIC ANEURYSM SCREENING (SYSTEM ASSIGNED)  06/11/2015     ADVANCE CARE PLANNING  07/13/2017     INFLUENZA VACCINE (1) 09/01/2019     PHQ-9  09/06/2019     LIPID  09/19/2019       Please call us at [phone] (or use [employer]) to address the above   recommendations.    Thank you for trusting [facility] and we appreciate the opportunity to serve you.  We look forward to supporting your healthcare needs in the future.    Healthy Regards,    Ish Figueroa MD

## 2020-01-06 NOTE — TELEPHONE ENCOUNTER
Panel Management Review      Patient has the following on his problem list: None      Composite cancer screening  Chart review shows that this patient is due/due soon for the following Fecal Colorectal (FIT)  Summary:    Patient is due/failing the following:   FIT    Action needed:   Patient needs office visit for med check per last OV visit note pt was to return in December. Also complete FIT test    Type of outreach:    Sent [employer] message.    Questions for provider review:    None                                                                                                                                    Amanda Krishnamurthy     Chart routed to Care Team .

## 2020-01-14 DIAGNOSIS — J44.1 COPD EXACERBATION (H): ICD-10-CM

## 2020-01-14 DIAGNOSIS — J41.1 MUCOPURULENT CHRONIC BRONCHITIS (H): ICD-10-CM

## 2020-01-14 NOTE — TELEPHONE ENCOUNTER
Routing refill request to provider for review/approval because:  Mucopurulent chronic bronchitis (H) not on diagnosis list for RN's to sign for this med.     Natalya Glover RN -- [facility]

## 2020-03-15 ENCOUNTER — HEALTH MAINTENANCE LETTER (OUTPATIENT)
Age: 70
End: 2020-03-15

## 2020-08-01 DIAGNOSIS — J41.1 MUCOPURULENT CHRONIC BRONCHITIS (H): ICD-10-CM

## 2020-09-01 DIAGNOSIS — J41.1 MUCOPURULENT CHRONIC BRONCHITIS (H): ICD-10-CM

## 2020-09-01 NOTE — TELEPHONE ENCOUNTER
Routing refill request to provider for review/approval because:  Sylvia given x1 and patient did not follow up, please advise    LOV 6/2019    Deepali Das RN

## 2020-09-02 NOTE — TELEPHONE ENCOUNTER
Left detailed voice mail to call clinic back and schedule a follow up appointment.    Daphney Martino MA

## 2020-09-03 NOTE — TELEPHONE ENCOUNTER
TRC on home/cell number--also sent [employer] message    Claudia Allen/CHARLIE Shaffer---[facility]

## 2021-01-10 ENCOUNTER — HEALTH MAINTENANCE LETTER (OUTPATIENT)
Age: 71
End: 2021-01-10

## 2021-05-08 ENCOUNTER — HEALTH MAINTENANCE LETTER (OUTPATIENT)
Age: 71
End: 2021-05-08

## 2021-10-23 ENCOUNTER — HEALTH MAINTENANCE LETTER (OUTPATIENT)
Age: 71
End: 2021-10-23

## 2022-06-04 ENCOUNTER — HEALTH MAINTENANCE LETTER (OUTPATIENT)
Age: 72
End: 2022-06-04

## 2022-10-09 ENCOUNTER — HEALTH MAINTENANCE LETTER (OUTPATIENT)
Age: 72
End: 2022-10-09

## 2023-06-10 ENCOUNTER — HEALTH MAINTENANCE LETTER (OUTPATIENT)
Age: 73
End: 2023-06-10

## 2023-07-18 ENCOUNTER — NURSE TRIAGE (OUTPATIENT)
Dept: NURSING | Facility: CLINIC | Age: 73
End: 2023-07-18
Payer: COMMERCIAL

## 2023-07-19 NOTE — TELEPHONE ENCOUNTER
Call received from son, Mina Horner Consent to Communicate on chart.    He was called by his mother to come out to their garage where his father was currently laying on the floor.    - Temporary loss of vision - Vision has returned  - Generalized weakness - hunched over table and then assisted to laying down on floor.  - Too weak to walk without assistance  - Nauseated - Vomited twice    Pt is a smoker - He went out to garage with spouse to smoke.  Hx COPD    911 advised    Mamie Perez RN  [facility]      Reason for Disposition    Shock suspected (e.g., cold/pale/clammy skin, too weak to stand, low BP, rapid pulse)    Sounds like a life-threatening emergency to the triager    Additional Information    Negative: SEVERE difficulty breathing (e.g., struggling for each breath, speaks in single words)    Protocols used: WEAKNESS (GENERALIZED) AND FATIGUE-A-AH